# Patient Record
Sex: FEMALE | Race: WHITE
[De-identification: names, ages, dates, MRNs, and addresses within clinical notes are randomized per-mention and may not be internally consistent; named-entity substitution may affect disease eponyms.]

---

## 2017-10-17 ENCOUNTER — HOSPITAL ENCOUNTER (OUTPATIENT)
Dept: HOSPITAL 45 - C.NEUR | Age: 47
Discharge: HOME | End: 2017-10-17
Attending: INTERNAL MEDICINE
Payer: COMMERCIAL

## 2017-10-17 DIAGNOSIS — I10: Primary | ICD-10-CM

## 2017-10-17 DIAGNOSIS — E66.01: ICD-10-CM

## 2017-10-17 DIAGNOSIS — R06.83: ICD-10-CM

## 2017-10-17 DIAGNOSIS — R06.81: ICD-10-CM

## 2017-10-20 NOTE — POLYSOMNOGRAPH REPORT
CLINICAL DATA:  A 46-year-old female with obesity, loud snoring, and apneic

episodes at night.  She is going to have bariatric surgery  and had a sleep

study performed prior to her surgical procedure.

 

On the evening of 10/17/2017 a home sleep apnea test was performed using a

[employer] type 3 monitor.  The patient does have a BMI of 58.36.

 

RECORDING RESULTS:  Total recording time was 10 hours.  The patient's

estimated sleep time was 8.2 hour.

 

RESPIRATORY DATA:  No evidence of clinically significant sleep apnea/hypopnea

was seen.  The JENNIFER was 4.1.  There was 1 obstructive, 1 mixed, and  5 central

apneic episodes.  There were 27 hypopneic episodes.  The longest respiratory

event was 22 seconds.

 

OXIMETRY DATA:  Very transient hypoxemia was seen.  Oxygen mk was 84%. 

Mean saturation was 93%.  Time below 89% was 1 minute.

 

HEART RATE DATA:  Heart rates ranged from 65 to 84 beats per minute.

 

SNORING DATA:  Loud snoring was recorded throughout the entire night.

 

IMPRESSION:  No evidence of clinically significant sleep apnea/hypopnea with

significant nocturnal hypoxemia.

 

RECOMMENDATIONS:  The patient should proceed on with her bariatric surgery as

planned. There is no need for CPAP based on this study.

 

 

MTDD

## 2018-03-20 ENCOUNTER — HOSPITAL ENCOUNTER (OUTPATIENT)
Dept: HOSPITAL 45 - C.GI | Age: 48
Discharge: HOME | End: 2018-03-20
Attending: INTERNAL MEDICINE
Payer: COMMERCIAL

## 2018-03-20 VITALS — DIASTOLIC BLOOD PRESSURE: 90 MMHG | OXYGEN SATURATION: 96 % | HEART RATE: 84 BPM | SYSTOLIC BLOOD PRESSURE: 141 MMHG

## 2018-03-20 VITALS
WEIGHT: 280.58 LBS | WEIGHT: 280.58 LBS | BODY MASS INDEX: 55.09 KG/M2 | HEIGHT: 60 IN | HEIGHT: 60 IN | BODY MASS INDEX: 55.09 KG/M2

## 2018-03-20 DIAGNOSIS — R10.13: Primary | ICD-10-CM

## 2018-03-20 DIAGNOSIS — E66.01: ICD-10-CM

## 2018-03-20 DIAGNOSIS — K25.9: ICD-10-CM

## 2018-03-20 DIAGNOSIS — Z79.1: ICD-10-CM

## 2018-03-20 DIAGNOSIS — K29.50: ICD-10-CM

## 2018-03-20 NOTE — ANESTHESIOLOGY PROGRESS NOTE
Anesthesia Post Op Note


Date & Time


Mar 20, 2018 at 14:58





Vital Signs


Pain Intensity:  0





Vital Signs Past 12 Hours








  Date Time  Temp Pulse Resp B/P (MAP) Pulse Ox O2 Delivery O2 Flow Rate FiO2


 


3/20/18 14:35  84 18 141/90 (107) 96 Room Air  


 


3/20/18 14:20  98 18 134/90 (105) 98 Room Air  


 


3/20/18 14:05  92 18 125/80 (95) 93 Room Air  


 


3/20/18 13:20 36.9 95 18 143/82 (102) 96 Room Air  











Notes


Mental Status:  alert / awake / arousable, participated in evaluation


Pt Amnestic to Procedure:  Yes


Nausea / Vomiting:  adequately controlled


Pain:  adequately controlled


Airway Patency, RR, SpO2:  stable & adequate


BP & HR:  stable & adequate


Hydration State:  stable & adequate


Anesthetic Complications:  no major complications apparent

## 2018-03-20 NOTE — GI REPORT
Procedure Date: 3/20/2018 1:48 PM

Procedure:            Upper GI endoscopy

Indications:          Epigastric abdominal pain, Preoperative assessment for 

                      bariatric surgery to treat morbid obesity; on chronic 

                      NSAIDs

Medicines:            Propofol per Anesthesia

Complications:        No immediate complications. Estimated blood loss: 

                      Minimal.

Estimated Blood Loss: Estimated blood loss was minimal.

Procedure:            Pre-Anesthesia Assessment:

                      - Prior to the procedure, a History and Physical was 

                      performed, and patient medications, allergies and 

                      sensitivities were reviewed. The patient's tolerance of 

                      previous anesthesia was reviewed.

                      - The risks and benefits of the procedure and the 

                      sedation options and risks were discussed with the 

                      patient. All questions were answered and informed 

                      consent was obtained.

                      - Patient identification and proposed procedure were 

                      verified prior to the procedure by the physician and 

                      the nurse. The procedure was verified in the 

                      pre-procedure area in the procedure room.

                      - Mental Status Examination: alert and oriented. Airway 

                      Examination: normal oropharyngeal airway and neck 

                      mobility. Respiratory Examination: clear to 

                      auscultation. CV Examination: normal. Abdominal 

                      Examination: bowel sounds present, abdomen soft and 

                      non-tender, no masses or organomegaly noted.

                      - ASA Grade Assessment: III - A patient with severe 

                      systemic disease.

                      After obtaining informed consent, the endoscope was 

                      passed under direct vision. Throughout the procedure, 

                      the patient's blood pressure, pulse, and oxygen 

                      saturations were monitored continuously. The scope was 

                      introduced through the mouth, and advanced to the third 

                      part of duodenum. The upper GI endoscopy was 

                      accomplished without difficulty. The patient tolerated 

                      the procedure well.

Findings:

     The esophagus was normal.

     Few non-bleeding superficial gastric ulcers were found in the gastric 

     antrum. Biopsies were taken with a cold forceps for Helicobacter pylori 

     testing. Verification of patient identification for the specimen was 

     done by the physician and nurse using the patient's name and birth date. 

     Estimated blood loss was minimal.

     Mildly erythematous mucosa without active bleeding and with no stigmata 

     of bleeding was found in the duodenal bulb.

     The second portion of the duodenum and third portion of the duodenum 

     were normal.

Impression:           - Normal esophagus.

                      - Several small non-bleeding gastric ulcers. Biopsied.

                      - Erythematous duodenopathy.

                      - Normal second portion of the duodenum and third 

                      portion of the duodenum.

Recommendation:       - Await pathology results.

                      - No ibuprofen, naproxen, or other non-steroidal 

                      anti-inflammatory drugs.

                      - Use a proton pump inhibitor PO BID for 6-8 weeks then 

                      once a day thereafter.

                      - Discharge patient to home.

ELYSSA Green T Suvock, 

3/20/2018 2:04:11 PM

This report has been signed electronically.

Note Initiated On: 3/20/2018 1:48 PM

     I attest to the content of the Intraoperative Record and orders 

     documented therein, exceptions below

## 2018-03-20 NOTE — DISCHARGE INSTRUCTIONS
Endoscopy Patient Instructions


Date / Procedure(s) Performed


Mar 20, 2018.


EGD





Allergy Information


Coded Allergies:  


     NO KNOWN DRUG ALLERGIES (Verified  Allergy, Unknown, ., 3/20/18)


Uncoded Allergies:  


     ORANGES (Allergy, Unknown, ITCHING THROAT, 3/14/18)


     PEANUTS (Allergy, Unknown, TONGUE ITCHING, BLISTERING UNDER EYES, 3/14/18)


 CAN EAT PEANUT BUTTER





Discharge Date / Findings


Mar 20, 2018.


multiple small stomach ulcers





Medication Instructions


Restart Stopped Medication(s):


OK to resume home medications. Would try to avoid anti-inflammatory medications.


Take omeprazole 20 mg twice a day for 6 weeks then oce a day thereafter or 

resume ranitidine.  Further instructions per Dr. Barkley.


OK to stop the Zantac (ranitidine) while on the omeprazole.





Provider Instructions





Activity Restrictions





-  No exercising or heavy lifting for 24 hours. 


-  Do not drink alcohol the day of the procedure.


-  Do not drive a car or operate machinery until the day after the procedure.


-  Do not make any important decisions or sign important papers in 24 hours 

after the procedure.





Following Day:





-  Return to full activity which may include returning to work/school.





Diet





Start your diet with liquids and light foods (jello, soup, juice, toast).  Then 

eat your usual diet if not nauseated.





Treatment For Common After Affects





For mild abdominal pain, bloating, or excessive gas:





-  Rest


-  Eat lightly


-  Lie on right side





Follow-Up Information


Follow-up with Dr. Aguilar as scheduled





Anesthesia Information





What You Should Know





You have had a procedure that required some medicine to reduce anxiety and 

discomfort. This treatment is called moderate sedation.  


After receiving the treatment, you may be sleepy, but you will be able to 

breathe on your own.  The effects of the treatment may last for several hours.








Follow these instructions along with Activity/Diet recommendations noted above:





*  Do NOT do anything where dizziness or clumsiness would be dangerous.





*  Rest quietly at home today, then you can be up and about tomorrow.





*  Have a responsible person stay with you the rest of today.





*  You may have had an I.V. today.  If so, you may take the dressing off later 

today.





Recommendations


 


Call your doctor if:





*  Trouble breathing 





*  Continuous vomiting for more than 24 hours








*  Temperature above 101 degrees





*  Severe abdominal pain or bloating





*  Pain not relieved by pain medicine ordered





*  There is increased drainage or redness from any incision





*  A large amount of rectal bleeding greater than 2-3 tablespoons. 


   (If you had a polyp/s removed or have hemorrhoids, a small amount of blood -


    from the rectum is to be expected.)





*  You have any unanswered questions or concerns.








IN THE EVENT OF A SERIOUS EMERGENCY, GO TO THE NEAREST EMERGENCY ROOM








       Your discharge instructions were prepared by provider Noemi Ha.





 Patient Instructions Signature Page














Kelsie Scott 











Patient (or Guardian) Signature/Date:____________________________________ I 

have read and understand the instructions given to me by my caregivers.








Caregiver/RN/Doctor Signature/Date:____________________________________











The above-named patient and/or guardian has received patient instructions on 

this date.





























+  Original Patient Signature Page (only) stays with chart.  Please make copy 

for patient.

## 2018-05-01 ENCOUNTER — HOSPITAL ENCOUNTER (OUTPATIENT)
Dept: HOSPITAL 45 - C.GI | Age: 48
Discharge: HOME | End: 2018-05-01
Attending: INTERNAL MEDICINE
Payer: COMMERCIAL

## 2018-05-01 VITALS — HEART RATE: 72 BPM | OXYGEN SATURATION: 99 % | SYSTOLIC BLOOD PRESSURE: 138 MMHG | DIASTOLIC BLOOD PRESSURE: 85 MMHG

## 2018-05-01 VITALS
BODY MASS INDEX: 55.09 KG/M2 | HEIGHT: 60 IN | WEIGHT: 280.58 LBS | BODY MASS INDEX: 55.09 KG/M2 | WEIGHT: 280.58 LBS | BODY MASS INDEX: 55.09 KG/M2 | HEIGHT: 60 IN

## 2018-05-01 DIAGNOSIS — Z09: Primary | ICD-10-CM

## 2018-05-01 DIAGNOSIS — Z80.0: ICD-10-CM

## 2018-05-01 DIAGNOSIS — M19.90: ICD-10-CM

## 2018-05-01 DIAGNOSIS — K29.50: ICD-10-CM

## 2018-05-01 DIAGNOSIS — Z91.018: ICD-10-CM

## 2018-05-01 DIAGNOSIS — Z79.899: ICD-10-CM

## 2018-05-01 DIAGNOSIS — E66.01: ICD-10-CM

## 2018-05-01 DIAGNOSIS — Z87.19: ICD-10-CM

## 2018-05-01 DIAGNOSIS — I10: ICD-10-CM

## 2018-05-01 DIAGNOSIS — Z91.010: ICD-10-CM

## 2018-05-01 DIAGNOSIS — F41.9: ICD-10-CM

## 2018-05-01 DIAGNOSIS — J45.909: ICD-10-CM

## 2018-05-01 DIAGNOSIS — F32.9: ICD-10-CM

## 2018-05-01 NOTE — ENDO HISTORY AND PHYSICAL
History & Physical


Date of Service:


May 1, 2018.


Chief Complaint:


RECHECK FOR GASTRIC ULCER PRE GASTRIC BYPASS SURGERY


Referring Physician:


DR HATHAWAY


History of Present Illness


Patient notes having no specific symptoms today.  She is here for follow-up 

endoscopy due to gastric ulcers seen on a recent upper endoscopy.





Past Surgical History


Hx Cardiac Surgery:  No


Hx Internal Defibrillator:  No


Hx Pacemaker:  No


Hx Abdominal Surgery:  Yes ( X 2, TUBAL LIGATION, D&C)


Hx Post-Op Nausea and Vomiting:  No


Hx Cancer Surgery:  No


Hx Thoracic Surgery:  No


Hx Orthopedic:  No


Hx Urinary Tract Surgery:  No





Family History


Colon CA





Social History


Smoking Status:  Never Smoker


Hx Substance Use:  No


Hx Alcohol Use:  Yes (OCCASIONALLY)





Allergies


Coded Allergies:  


     NO KNOWN DRUG ALLERGIES (Verified  Allergy, Unknown, ., 18)


Uncoded Allergies:  


     ORANGES (Allergy, Unknown, ITCHING THROAT, 3/14/18)


     PEANUTS (Allergy, Unknown, TONGUE ITCHING, BLISTERING UNDER EYES, 3/14/18)


 CAN EAT PEANUT BUTTER





Current Medications





Reported Home Medications








 Medications  Dose


 Route/Sig


 Max Daily Dose Days Date Category


 


 Tylenol


  (Acetaminophen)


 500 Mg Tab  1,000 Mg


 PO Q4 PRN


    18 Reported


 


 Hair Skin & Nails


 ... 1250-7.5-7.5


 Mcg-mg-Unt


  (Biotin W/


 Vitamins C & E) 1


 Chw Chw  1 Tab


 PO QAM


    18 Reported


 


 Flintstones


 Chewable


  (Pediatric


 Multiple Vitamin


 W/) 1 Chw Chw  1 Tab


 PO QAM


    18 Reported


 


 Atrovent Hfa


  (Ipratropium


 Bromide) 200


 Puffs/3400 Mcg


 Aers  2 Puffs


 INH QID PRN


    18 Reported


 


 Carafate


  (Sucralfate) 1 Gm


 Tab  1 Tab


 PO QID


   30 18 Reported


 


 Prilosec


  (Omeprazole) 20


 Mg Capcr  20 Mg


 PO BID


    18 Reported


 


 Norvasc


  (Amlodipine


 Besylate) 5 Mg Tab  5 Mg


 PO QAM


    3/14/18 Reported


 


 Ultram (Tramadol


 HCl) 50 Mg Tab  50 Mg


 PO Q6H PRN


    3/14/18 Reported


 


 Effexor


  (Venlafaxine Hcl)


 75 Mg Tab  2 Tab


 PO QAM


    3/14/18 Reported


 


 Prinivil


  (Lisinopril) 20


 Mg Tab  20 Mg


 PO QAM


    3/14/18 Reported


 


 Combivent


 Respimat


  (Ipratropium-Albuterol)


 1 Aer Aer  1 Puffs


 INH QID PRN


    3/14/18 Reported


 


 Buspirone Hcl 7.5


 Mg Tab  1 Tab


 PO QAM


    3/14/18 Reported











Vital Signs


Weight (Kilograms):  127.27


Height (Feet):  5


Height (Inches):  0











  Date Time  Temp Pulse Resp B/P (MAP) Pulse Ox O2 Delivery O2 Flow Rate FiO2


 


18 09:00 36.7 83 20 145/92 (109) 96 Room Air  











Physical Exam


General Appearance:  no apparent distress


Respiratory/Chest:  


   Auscultation:  breath sounds normal


Cardiovascular:  


   Heart Auscultation:  RRR


Abdomen:  


   Inspection & Palpation:  soft





Assessment and Plan


Patient for upper endoscopy today.  We discussed the risks to include bleeding, 

infection, perforation, pulmonary problems and cardiac issues.

## 2018-05-01 NOTE — GI REPORT
Patient Name: Sangeetha Radford

Procedure Date: 5/1/2018 8:59 AM

MRN: [id number]

Account Number: [id number]

YOB: 1970

Admit Type: Outpatient

Age: 47

Gender: Female

Attending MD: Lan Garg DO

Procedure:            Upper GI endoscopy

Providers:            Lan Garg DO

Referring MD:         Bandar Carbajal

Indications:          Follow-up of gastric ulcer

Medicines:            Monitored Anesthesia Care

Complications:        No immediate complications. Estimated blood loss: 

                      Minimal.

Estimated Blood Loss: Estimated blood loss was minimal.

Procedure:            Pre-Anesthesia Assessment:

                      - Prior to the procedure, a History and Physical was 

                      performed, and patient medications, allergies and 

                      sensitivities were reviewed. The patient's tolerance of 

                      previous anesthesia was reviewed.

                      - The risks and benefits of the procedure and the 

                      sedation options and risks were discussed with the 

                      patient. All questions were answered and informed 

                      consent was obtained.

                      - Patient identification and proposed procedure were 

                      verified prior to the procedure by the physician, the 

                      nurse and the anesthetist. The procedure was verified 

                      in the procedure room.

                      - Pre-procedure physical examination revealed no 

                      contraindications to sedation.

                      - ASA Grade Assessment: III - A patient with severe 

                      systemic disease.

                      - After reviewing the risks and benefits, the patient 

                      was deemed in satisfactory condition to undergo the 

                      procedure.

                      - The anesthesia plan was to use monitored anesthesia 

                      care (MAC).

                      - Immediately prior to administration of medications, 

                      the patient was re-assessed for adequacy to receive 

                      sedatives.

                      - The heart rate, respiratory rate, oxygen saturations, 

                      blood pressure, adequacy of pulmonary ventilation, and 

                      response to care were monitored throughout the 

                      procedure.

                      - The physical status of the patient was re-assessed 

                      after the procedure.

                      After obtaining informed consent, the endoscope was 

                      passed under direct vision. Throughout the procedure, 

                      the patient's blood pressure, pulse, and oxygen 

                      saturations were monitored continuously. The Scope was 

                      introduced through the mouth, and advanced to the third 

                      part of duodenum. The upper GI endoscopy was 

                      accomplished without difficulty. The patient tolerated 

                      the procedure well.

Findings:

     The examined esophagus was normal.

     The Z-line was regular and was found 35 cm from the incisors.

     The gastric fundus, gastric body and incisura were normal.

     Localized minimal inflammation characterized by erythema and granularity 

     was found in the gastric antrum. Biopsies were taken with a cold forceps 

     for histology. Estimated blood loss was minimal.

     The examined duodenum was normal.

Impression:           - Normal esophagus.

                      - Z-line regular, 35 cm from the incisors.

                      - Normal gastric fundus, gastric body and incisura.

                      - Mild gastritis (no ulcers seen today). Biopsied.

                      - Normal examined duodenum.

Recommendation:       - Discharge patient to home (ambulatory).

                      - Advance diet as tolerated today.

                      - Continue present medications.

Lan Garg D.O.

Lan Garg, 

5/1/2018 9:37:11 AM

This report has been signed electronically.

Note Initiated On: 5/1/2018 8:59 AM

Number of Addenda: 0

     I attest to the content of the Intraoperative Record and orders 

     documented therein, exceptions below



{[id number]}

## 2018-05-01 NOTE — DISCHARGE INSTRUCTIONS
Endoscopy Patient Instructions


Date / Procedure(s) Performed


May 1, 2018.


EGD





Allergy Information


Coded Allergies:  


     NO KNOWN DRUG ALLERGIES (Verified  Allergy, Unknown, ., 5/1/18)


Uncoded Allergies:  


     ORANGES (Allergy, Unknown, ITCHING THROAT, 3/14/18)


     PEANUTS (Allergy, Unknown, TONGUE ITCHING, BLISTERING UNDER EYES, 3/14/18)


 CAN EAT PEANUT BUTTER





Discharge Date / Findings


May 1, 2018.


Mild gastritis (otherwise normal)





Medication Instructions





Reported Home Medications








 Medications  Dose


 Route/Sig


 Max Daily Dose Days Date Category


 


 Tylenol


  (Acetaminophen)


 500 Mg Tab  1,000 Mg


 PO Q4 PRN


    4/24/18 Reported


 


 Hair Skin & Nails


 ... 1250-7.5-7.5


 Mcg-mg-Unt


  (Biotin W/


 Vitamins C & E) 1


 Chw Chw  1 Tab


 PO QAM


    4/24/18 Reported


 


 Flintstones


 Chewable


  (Pediatric


 Multiple Vitamin


 W/) 1 Chw Chw  1 Tab


 PO QAM


    4/24/18 Reported


 


 Atrovent Hfa


  (Ipratropium


 Bromide) 200


 Puffs/3400 Mcg


 Aers  2 Puffs


 INH QID PRN


    4/24/18 Reported


 


 Carafate


  (Sucralfate) 1 Gm


 Tab  1 Tab


 PO QID


   30 4/24/18 Reported


 


 Prilosec


  (Omeprazole) 20


 Mg Capcr  20 Mg


 PO BID


    4/24/18 Reported


 


 Norvasc


  (Amlodipine


 Besylate) 5 Mg Tab  5 Mg


 PO QAM


    3/14/18 Reported


 


 Ultram (Tramadol


 HCl) 50 Mg Tab  50 Mg


 PO Q6H PRN


    3/14/18 Reported


 


 Effexor


  (Venlafaxine Hcl)


 75 Mg Tab  2 Tab


 PO QAM


    3/14/18 Reported


 


 Prinivil


  (Lisinopril) 20


 Mg Tab  20 Mg


 PO QAM


    3/14/18 Reported


 


 Combivent


 Respimat


  (Ipratropium-Albuterol)


 1 Aer Aer  1 Puffs


 INH QID PRN


    3/14/18 Reported


 


 Buspirone Hcl 7.5


 Mg Tab  1 Tab


 PO QAM


    3/14/18 Reported











Provider Instructions





Activity Restrictions





-  No exercising or heavy lifting for 24 hours. 


-  Do not drink alcohol the day of the procedure.


-  Do not drive a car or operate machinery until the day after the procedure.


-  Do not make any important decisions or sign important papers in 24 hours 

after the procedure.





Following Day:





-  Return to full activity which may include returning to work/school.





Diet





Start your diet with liquids and light foods (jello, soup, juice, toast).  Then 

eat your usual diet if not nauseated.





Treatment For Common After Affects





For mild abdominal pain, bloating, or excessive gas:





-  Rest


-  Eat lightly


-  Lie on right side





Follow-Up Information


Follow-up with DR HATHAWAY and Dr. Downs as scheduled


Would continue Omeprazole at 20 mg per day


Stop Carafate


Follow-up with gastroenterology () as needed.





Anesthesia Information





What You Should Know





You have had a procedure that required some medicine to reduce anxiety and 

discomfort. This treatment is called moderate sedation.  


After receiving the treatment, you may be sleepy, but you will be able to 

breathe on your own.  The effects of the treatment may last for several hours.








Follow these instructions along with Activity/Diet recommendations noted above:





*  Do NOT do anything where dizziness or clumsiness would be dangerous.





*  Rest quietly at home today, then you can be up and about tomorrow.





*  Have a responsible person stay with you the rest of today.





*  You may have had an I.V. today.  If so, you may take the dressing off later 

today.





Recommendations


 


Call your doctor if:





*  Trouble breathing 





*  Continuous vomiting for more than 24 hours








*  Temperature above 101 degrees





*  Severe abdominal pain or bloating





*  Pain not relieved by pain medicine ordered





*  There is increased drainage or redness from any incision





*  A large amount of rectal bleeding greater than 2-3 tablespoons. 


   (If you had a polyp/s removed or have hemorrhoids, a small amount of blood -


    from the rectum is to be expected.)





*  You have any unanswered questions or concerns.








IN THE EVENT OF A SERIOUS EMERGENCY, GO TO THE NEAREST EMERGENCY ROOM








       Your discharge instructions were prepared by provider Lan Garg.





 Patient Instructions Signature Page














Sangeetha Radford 











Patient (or Guardian) Signature/Date:____________________________________ I 

have read and understand the instructions given to me by my caregivers.








Caregiver/RN/Doctor Signature/Date:____________________________________











The above-named patient and/or guardian has received patient instructions on 

this date.





























+  Original Patient Signature Page (only) stays with chart.  Please make copy 

for patient.

## 2019-05-06 NOTE — ANESTHESIOLOGY PROGRESS NOTE
Anesthesia Post Op Note


Date & Time


May 1, 2018 at 09:54





Vital Signs


Pain Intensity:  0





Vital Signs Past 12 Hours








  Date Time  Temp Pulse Resp B/P (MAP) Pulse Ox O2 Delivery O2 Flow Rate FiO2


 


5/1/18 09:35  78 16 132/86 (101) 96 Room Air  


 


5/1/18 09:00 36.7 83 20 145/92 (109) 96 Room Air  











Notes


Mental Status:  alert / awake / arousable, participated in evaluation


Pt Amnestic to Procedure:  Yes


Nausea / Vomiting:  adequately controlled


Pain:  adequately controlled


Airway Patency, RR, SpO2:  stable & adequate


BP & HR:  stable & adequate


Hydration State:  stable & adequate


Anesthetic Complications:  no major complications apparent
DC instructions

## 2022-10-26 NOTE — ENDO HISTORY AND PHYSICAL
History & Physical


Date of Service:


Mar 20, 2018.


Chief Complaint:


abd pain


Referring Physician:


Dr. Aguilar


History of Present Illness


abd pain; upcoming weight loss surgery





Past Surgical History


Hx Cardiac Surgery:  No


Hx Internal Defibrillator:  No


Hx Pacemaker:  No


Hx Abdominal Surgery:  Yes ( X 2, TUBAL LIGATION, D&C)


Hx of Implantable Prosthesis:  No


Hx Post-Op Nausea and Vomiting:  No


Hx Cancer Surgery:  No


Hx Thoracic Surgery:  No


Hx Orthopedic:  No


Hx Urinary Tract Surgery:  No





Family History


Colon CA





Social History


Smoking Status:  Never Smoker


Hx Substance Use:  No


Hx Alcohol Use:  Yes (OCCASIONALLY)





Allergies


Coded Allergies:  


     NO KNOWN DRUG ALLERGIES (Verified  Allergy, Unknown, ., 3/20/18)


Uncoded Allergies:  


     ORANGES (Allergy, Unknown, ITCHING THROAT, 3/14/18)


     PEANUTS (Allergy, Unknown, TONGUE ITCHING, BLISTERING UNDER EYES, 3/14/18)


 CAN EAT PEANUT BUTTER





Current Medications





Reported Home Medications








 Medications  Dose


 Route/Sig


 Max Daily Dose Days Date Category


 


 Aleve (Naproxen)


 220 Mg Tab  220 Mg


 PO


    3/20/18 Reported


 


 Norvasc


  (Amlodipine


 Besylate) 5 Mg Tab  5 Mg


 PO QAM


    3/14/18 Reported


 


 Ultram (Tramadol


 HCl) 50 Mg Tab  50 Mg


 PO Q6H PRN


    3/14/18 Reported


 


 Effexor


  (Venlafaxine Hcl)


 75 Mg Tab  2 Tab


 PO QAM


    3/14/18 Reported


 


 Zantac


  (Ranitidine HCl)


 150 Mg Tab  150 Mg


 PO QAM


    3/14/18 Reported


 


 Prinivil


  (Lisinopril) 20


 Mg Tab  20 Mg


 PO QAM


    3/14/18 Reported


 


 Combivent


 Respimat


  (Ipratropium-Albuterol)


 1 Aer Aer  1 Puffs


 INH QID PRN


    3/14/18 Reported


 


 Buspirone Hcl 7.5


 Mg Tab  1 Tab


 PO QAM


    3/14/18 Reported











Vital Signs


Weight (Kilograms):  127.27


Height (Feet):  5


Height (Inches):  0











  Date Time  Temp Pulse Resp B/P (MAP) Pulse Ox O2 Delivery O2 Flow Rate FiO2


 


3/20/18 13:20 36.9 95 18 143/82 (102) 96 Room Air  











Physical Exam


General Appearance:  WD/WN, no apparent distress





Assessment and Plan


EGD today [Initial Visit] : an initial visit for [FreeTextEntry2] : Left shoulder pain